# Patient Record
Sex: FEMALE | Race: OTHER | HISPANIC OR LATINO | URBAN - METROPOLITAN AREA
[De-identification: names, ages, dates, MRNs, and addresses within clinical notes are randomized per-mention and may not be internally consistent; named-entity substitution may affect disease eponyms.]

---

## 2018-12-22 ENCOUNTER — EMERGENCY (EMERGENCY)
Facility: HOSPITAL | Age: 28
LOS: 1 days | Discharge: HOME | End: 2018-12-22
Admitting: EMERGENCY MEDICINE
Payer: SELF-PAY

## 2018-12-22 VITALS
TEMPERATURE: 98 F | OXYGEN SATURATION: 98 % | HEIGHT: 70 IN | RESPIRATION RATE: 18 BRPM | HEART RATE: 78 BPM | SYSTOLIC BLOOD PRESSURE: 102 MMHG | DIASTOLIC BLOOD PRESSURE: 98 MMHG | WEIGHT: 190.92 LBS

## 2018-12-22 PROCEDURE — 99283 EMERGENCY DEPT VISIT LOW MDM: CPT

## 2018-12-22 NOTE — ED PROVIDER NOTE - MEDICAL DECISION MAKING DETAILS
well appearing 28 female with head injury. no loc. neuro intact. mild erythema - with mild lump. no hemotympanum. no reed signs. observed for several hours in ed. possible concussion, do not suspect intracranial hemorrhage. no blood thinners. advised cognitive rest and to fu with pmd in 24-48 hours. strict return to ed precautions. pt agrees with plan and is happy with evaluation.

## 2018-12-22 NOTE — ED ADULT NURSE NOTE - OBJECTIVE STATEMENT
Pt presents c/o 6/10 HA s/p head injury w/o LOC x2 hours ago.  Pt states she bent down to  something and struck her head.  Pt is PERRLA, EOMI and w/o neuro deficit.  Pt is pending eval by [facility].

## 2018-12-22 NOTE — ED ADULT NURSE NOTE - CHPI ED NUR SYMPTOMS NEG
no dizziness/no loss of consciousness/no change in level of consciousness/no confusion/no blurred vision/no nausea/no syncope/no weakness/no vomiting

## 2018-12-22 NOTE — ED ADULT TRIAGE NOTE - CHIEF COMPLAINT QUOTE
Pt CO head injury with subsequent headache and photosensitivity.  PT states "I bent down to pick something up and I slammed my forehead against something."  PT denies LOC, dizziness, N/V/D, SOB, Fevers and CP.

## 2018-12-22 NOTE — ED PROVIDER NOTE - NEUROLOGICAL, MLM
Alert and oriented, no focal deficits, no motor or sensory deficits. CN II - XII intact, negative rhomberg

## 2018-12-22 NOTE — ED PROVIDER NOTE - NSFOLLOWUPINSTRUCTIONS_ED_ALL_ED_FT
PLEASE CONDUCT COGNITIVE REST AS DISCUSSED AND FOLLOW UP WITH YOUR PMD.     Post-Concussion Syndrome  Post-concussion syndrome describes the symptoms that can occur after a head injury. These symptoms can last from weeks to months.    What are the causes?  It is not clear why some head injuries cause post-concussion syndrome. It can occur whether your head injury was mild or severe and whether you were wearing head protection or not.    What are the signs or symptoms?  Memory difficulties.  Dizziness.  Headaches.  Double vision or blurry vision.  Sensitivity to light.  Hearing difficulties.  Depression.  Tiredness.  Weakness.  Difficulty with concentration.  Difficulty sleeping or staying asleep.  Vomiting.  Poor balance or instability on your feet.  Slow reaction time.  ImageDifficulty learning and remembering things you have heard.  How is this diagnosed?  There is no test to determine whether you have post-concussion syndrome. Your health care provider may order an imaging scan of your brain, such as a CT scan, to check for other problems that may be causing your symptoms (such as a severe injury inside your skull).    How is this treated?  Usually, these problems disappear over time without medical care. Your health care provider may prescribe medicine to help ease your symptoms. It is important to follow up with a neurologist to evaluate your recovery and address any lingering symptoms or issues.    Follow these instructions at home:  Take medicines only as directed by your health care provider. Do not take aspirin. Aspirin can slow blood clotting.  Sleep with your head slightly elevated to help with headaches.  Avoid any situation where there is potential for another head injury. This includes football, hockey, soccer, basketball, martial arts, downhill snow sports, and horseback riding. Your condition will get worse every time you experience a concussion. You should avoid these activities until you are evaluated by the appropriate follow-up health care providers.  Keep all follow-up visits as directed by your health care provider. This is important.  Contact a health care provider if:  You have increased problems paying attention or concentrating.  You have increased difficulty remembering or learning new information.  You need more time to complete tasks or assignments than before.  You have increased irritability or decreased ability to cope with stress.  You have more symptoms than before.  Seek medical care if you have any of the following symptoms for more than two weeks after your injury:    Lasting (chronic) headaches.  Dizziness or balance problems.  Nausea.  Vision problems.  Increased sensitivity to noise or light.  Depression or mood swings.  Anxiety or irritability.  Memory problems.  Difficulty concentrating or paying attention.  Sleep problems.  Feeling tired all the time.    Get help right away if:  You have confusion or unusual drowsiness.  Others find it difficult to wake you up.  You have nausea or persistent, forceful vomiting.  You feel like you are moving when you are not (vertigo). Your eyes may move rapidly back and forth.  You have convulsions or faint.  You have severe, persistent headaches that are not relieved by medicine.  You cannot use your arms or legs normally.  One of your pupils is larger than the other.  You have clear or bloody discharge from your nose or ears.  Your problems are getting worse, not better.  This information is not intended to replace advice given to you by your health care provider. Make sure you discuss any questions you have with your health care provider.

## 2018-12-26 DIAGNOSIS — S09.90XA UNSPECIFIED INJURY OF HEAD, INITIAL ENCOUNTER: ICD-10-CM

## 2018-12-26 DIAGNOSIS — Z91.018 ALLERGY TO OTHER FOODS: ICD-10-CM
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
## 2021-04-14 NOTE — ED PROVIDER NOTE - OBJECTIVE STATEMENT
I fell at home when I went to grab my walker; denies LOC and thjinners
27 y/o female with no PMHx is present head injury x1 day. Pt states she was in the shower when she bent over and hit her head on the side of the sink. Pt reports pain located to the forehead. There was swelling that has since subsided. Pt reports pain located at the site with slight dizzy and nausea however has somewhat improved. She denies the following: LOC, blurred vision, double vision, numbness/tingling, neck/back pain, nausea/vomiting. n